# Patient Record
Sex: FEMALE | Race: WHITE | NOT HISPANIC OR LATINO | URBAN - METROPOLITAN AREA
[De-identification: names, ages, dates, MRNs, and addresses within clinical notes are randomized per-mention and may not be internally consistent; named-entity substitution may affect disease eponyms.]

---

## 2024-08-25 NOTE — PROGRESS NOTES
Assessment/Plan:  The natural course of menopause and associated vasomotor symptoms were discussed.   Average age of menopause in [place] is 51 yo but varies based on genetic factors and tobacco use.   Hot flashes are the most common symptoms experienced by women during the menopausal transition but other symptoms may occur including insomnia, vaginal dryness, and cognitive changes.   The following are options for management:  lifestyle changes (dressing in layers, keeping room temperature low, using a fan, avoiding triggers, weight loss, exercise), CBT, acupuncture, evening primrose oil, hypnosis, vitamin E, and black cohosh.  Self care encouraged:  Exercise 150-300  minutes per week including weight bearing exercises for bone health.  This is also a mood elevator and stress reliever. Regular exercise may improve your sleep.   Sleep hygiene-keep your sleep schedule consistent. Use your bed for sleep and sex only. Avoid blue light stimulation 2-3 hours before bed. Try stress relieving activities.  Remain adequately hydrated.  Attempt to drink 64 oz of water from waking until dinner time and drink for mouth comfort after that point.   Healthy diet recommended.   Food log (ie.) www.[employer].com, sparkpeople.com, loseit.com, [employer].com, etc.  Recommend not eating 2 hours before bed.   Consider trying meditation.Free apps are available on your phone and will talk you through the process.   Return to gyn office for annual exam as discussed and call here as needed.     I have spent a total time of 32 minutes in caring for this patient on the day of the visit/encounter including Risks and benefits of tx options, Instructions for management, Patient and family education, Risk factor reductions, Impressions, Documenting in the medical record, Obtaining or reviewing history  , and discussion of topics listed above .          1. Hormone disturbance             Subjective:      Patient ID: Fely Lam is a 44 y.o.  female.    HPI  Fely Lam is a 44 y.o.  ( 17 yo girl, 17 yo girl, 15 yo boy, 10 yo boy) female who is here today as a new patient  for a problem visit .     Here today with concerns about her hormones earlier this year.   She was having nighttime hot flashes, heavy menses, disrupted sleep mood changes which have all since settled.   She wants to be proactive prior to menopause.   Monthly menses x 5-7 days with heavy flow x 2-3 days then light flow. Menses is acceptable.   Fely Lam is sexually active with male partner of 21 years. Denies pain, bleeding or dryness.  She is  monogamous.   She uses tubal ligation  for contraception.    She denies vaginal discharge, itching, pelvic pain.   She has no  urinary concerns, does have rare NIKKI.  No bowel concerns.  No breast concerns.     Exercises  most days of the week.  Admits to a healthy diet.      The following portions of the patient's history were reviewed and updated as appropriate: allergies, current medications, past family history, past medical history, past social history, past surgical history, and problem list.    Review of Systems   Constitutional: Negative.  Negative for activity change, appetite change, chills, diaphoresis, fatigue, fever and unexpected weight change.   Eyes:  Negative for visual disturbance.   Respiratory:  Negative for chest tightness and shortness of breath.    Cardiovascular:  Negative for chest pain.   Genitourinary:  Negative for difficulty urinating, dyspareunia, dysuria, frequency, hematuria, menstrual problem, pelvic pain, urgency, vaginal bleeding, vaginal discharge and vaginal pain.   Musculoskeletal:  Negative for back pain.   Skin: Negative.    Allergic/Immunologic: Negative.    Neurological:  Negative for weakness and headaches.   Hematological:  Negative for adenopathy.   Psychiatric/Behavioral: Negative.           Objective:      /68 (BP Location: Left arm, Patient Position: Sitting, Cuff Size: Standard)    "Ht 5' 5\" (1.651 m)   Wt 66 kg (145 lb 6.4 oz)   LMP 08/22/2024 (Exact Date)   BMI 24.20 kg/m²          Physical Exam  Vitals and nursing note reviewed.   Constitutional:       Appearance: Normal appearance. She is well-developed.   Skin:     General: Skin is warm and dry.   Neurological:      Mental Status: She is alert and oriented to person, place, and time.   Psychiatric:         Mood and Affect: Mood normal.         Behavior: Behavior normal.       Exam otherwise deferred.   "

## 2024-08-26 ENCOUNTER — OFFICE VISIT (OUTPATIENT)
Dept: OBGYN CLINIC | Facility: CLINIC | Age: 44
End: 2024-08-26
Payer: COMMERCIAL

## 2024-08-26 VITALS
BODY MASS INDEX: 24.22 KG/M2 | SYSTOLIC BLOOD PRESSURE: 124 MMHG | DIASTOLIC BLOOD PRESSURE: 68 MMHG | WEIGHT: 145.4 LBS | HEIGHT: 65 IN

## 2024-08-26 DIAGNOSIS — E34.9 HORMONE DISTURBANCE: Primary | ICD-10-CM

## 2024-08-26 PROCEDURE — 99203 OFFICE O/P NEW LOW 30 MIN: CPT | Performed by: NURSE PRACTITIONER

## 2024-08-26 RX ORDER — DIPHENOXYLATE HYDROCHLORIDE AND ATROPINE SULFATE 2.5; .025 MG/1; MG/1
1 TABLET ORAL DAILY
COMMUNITY

## 2024-08-26 RX ORDER — MILK THISTLE FRUIT EXTRACT 140 MG
CAPSULE ORAL
COMMUNITY

## 2024-08-26 RX ORDER — VITAMIN B COMPLEX
1 CAPSULE ORAL DAILY
COMMUNITY

## 2024-08-26 RX ORDER — AMOXICILLIN 500 MG
CAPSULE ORAL
COMMUNITY

## 2024-08-26 NOTE — PATIENT INSTRUCTIONS
The natural course of menopause and associated vasomotor symptoms were discussed.   Average age of menopause in [place] is 51 yo but varies based on genetic factors and tobacco use.   Hot flashes are the most common symptoms experienced by women during the menopausal transition but other symptoms may occur including insomnia, vaginal dryness, and cognitive changes.   The following are options for management:  lifestyle changes (dressing in layers, keeping room temperature low, using a fan, avoiding triggers, weight loss, exercise), CBT, acupuncture, evening primrose oil, hypnosis, vitamin E, and black cohosh.  Self care encouraged:  Exercise 150-300  minutes per week including weight bearing exercises for bone health.  This is also a mood elevator and stress reliever. Regular exercise may improve your sleep.   Sleep hygiene-keep your sleep schedule consistent. Use your bed for sleep and sex only. Avoid blue light stimulation 2-3 hours before bed. Try stress relieving activities.  Remain adequately hydrated.  Attempt to drink 64 oz of water from waking until dinner time and drink for mouth comfort after that point.   Healthy diet recommended.   Food log (ie.) www.[employer].com, sparkpeople.com, loseit.com, [employer].com, etc.  Recommend not eating 2 hours before bed.   Consider trying meditation.Free apps are available on your phone and will talk you through the process.   Return to gyn office for annual exam as discussed and call here as needed.